# Patient Record
Sex: MALE
[De-identification: names, ages, dates, MRNs, and addresses within clinical notes are randomized per-mention and may not be internally consistent; named-entity substitution may affect disease eponyms.]

---

## 2024-09-24 PROBLEM — Z00.00 ENCOUNTER FOR PREVENTIVE HEALTH EXAMINATION: Status: ACTIVE | Noted: 2024-09-24

## 2024-10-03 ENCOUNTER — NON-APPOINTMENT (OUTPATIENT)
Age: 24
End: 2024-10-03

## 2024-10-03 ENCOUNTER — TRANSCRIPTION ENCOUNTER (OUTPATIENT)
Age: 24
End: 2024-10-03

## 2024-10-03 ENCOUNTER — APPOINTMENT (OUTPATIENT)
Dept: INTERNAL MEDICINE | Facility: CLINIC | Age: 24
End: 2024-10-03
Payer: MEDICAID

## 2024-10-03 VITALS
HEART RATE: 83 BPM | RESPIRATION RATE: 16 BRPM | HEIGHT: 68 IN | SYSTOLIC BLOOD PRESSURE: 135 MMHG | BODY MASS INDEX: 38.49 KG/M2 | OXYGEN SATURATION: 97 % | DIASTOLIC BLOOD PRESSURE: 85 MMHG | WEIGHT: 254 LBS

## 2024-10-03 DIAGNOSIS — Z00.00 ENCOUNTER FOR GENERAL ADULT MEDICAL EXAMINATION W/OUT ABNORMAL FINDINGS: ICD-10-CM

## 2024-10-03 DIAGNOSIS — Z83.3 FAMILY HISTORY OF DIABETES MELLITUS: ICD-10-CM

## 2024-10-03 PROCEDURE — 93000 ELECTROCARDIOGRAM COMPLETE: CPT

## 2024-10-03 PROCEDURE — 36415 COLL VENOUS BLD VENIPUNCTURE: CPT

## 2024-10-03 PROCEDURE — 99385 PREV VISIT NEW AGE 18-39: CPT

## 2024-10-03 NOTE — HISTORY OF PRESENT ILLNESS
[de-identified] : Patient comes to the office for the first time for a physical with overall no recent changes in exertional or functional ability.  Has not seen any other medical care provider. No acute complaints.

## 2024-10-03 NOTE — PHYSICAL EXAM
[Obese] : patient was observed to be obese [Normal] : normal gait, coordination grossly intact, no focal deficits [de-identified] : Declined

## 2024-10-03 NOTE — HEALTH RISK ASSESSMENT
[Good] : ~his/her~  mood as  good [Yes] : Yes [2 - 3 times a week (3 pts)] : 2 - 3  times a week (3 points) [1 or 2 (0 pts)] : 1 or 2 (0 points) [Never (0 pts)] : Never (0 points) [No falls in past year] : Patient reported no falls in the past year [0] : 2) Feeling down, depressed, or hopeless: Not at all (0) [PHQ-2 Negative - No further assessment needed] : PHQ-2 Negative - No further assessment needed [Patient declined Retinal Exam] : Patient declined Retinal Exam. [HIV test declined] : HIV test declined [Hepatitis C test declined] : Hepatitis C test declined [None] : None [With Family] : lives with family [Employed] : employed [Single] : single [# Of Children ___] : has [unfilled] children [Fully functional (bathing, dressing, toileting, transferring, walking, feeding)] : Fully functional (bathing, dressing, toileting, transferring, walking, feeding) [Fully functional (using the telephone, shopping, preparing meals, housekeeping, doing laundry, using] : Fully functional and needs no help or supervision to perform IADLs (using the telephone, shopping, preparing meals, housekeeping, doing laundry, using transportation, managing medications and managing finances) [Smoke Detector] : smoke detector [Carbon Monoxide Detector] : carbon monoxide detector [Never] : Never [Audit-CScore] : 3 [[id number]] : 0 [Reports changes in hearing] : Reports no changes in hearing [Reports changes in vision] : Reports no changes in vision [Reports changes in dental health] : Reports no changes in dental health

## 2024-10-04 LAB
ALBUMIN SERPL ELPH-MCNC: 4.4 G/DL
ALP BLD-CCNC: 45 U/L
ALT SERPL-CCNC: 43 U/L
ANION GAP SERPL CALC-SCNC: 15 MMOL/L
AST SERPL-CCNC: 22 U/L
BILIRUB SERPL-MCNC: 0.4 MG/DL
BUN SERPL-MCNC: 15 MG/DL
CALCIUM SERPL-MCNC: 9.6 MG/DL
CHLORIDE SERPL-SCNC: 104 MMOL/L
CHOLEST SERPL-MCNC: 186 MG/DL
CO2 SERPL-SCNC: 23 MMOL/L
CREAT SERPL-MCNC: 1.11 MG/DL
EGFR: 96 ML/MIN/1.73M2
ESTIMATED AVERAGE GLUCOSE: 117 MG/DL
GLUCOSE SERPL-MCNC: 105 MG/DL
HBA1C MFR BLD HPLC: 5.7 %
HCT VFR BLD CALC: 44.5 %
HDLC SERPL-MCNC: 34 MG/DL
HGB BLD-MCNC: 14.1 G/DL
LDLC SERPL CALC-MCNC: 124 MG/DL
MCHC RBC-ENTMCNC: 26.6 PG
MCHC RBC-ENTMCNC: 31.7 GM/DL
MCV RBC AUTO: 83.8 FL
NONHDLC SERPL-MCNC: 152 MG/DL
PLATELET # BLD AUTO: 243 K/UL
POTASSIUM SERPL-SCNC: 4.6 MMOL/L
PROT SERPL-MCNC: 7.2 G/DL
RBC # BLD: 5.31 M/UL
RBC # FLD: 11.9 %
SODIUM SERPL-SCNC: 142 MMOL/L
TRIGL SERPL-MCNC: 155 MG/DL
TSH SERPL-ACNC: 1.83 UIU/ML
WBC # FLD AUTO: 6.51 K/UL

## 2025-01-08 ENCOUNTER — APPOINTMENT (OUTPATIENT)
Dept: INTERNAL MEDICINE | Facility: CLINIC | Age: 25
End: 2025-01-08
Payer: MEDICAID

## 2025-01-08 DIAGNOSIS — A08.11 ACUTE GASTROENTEROPATHY DUE TO NORWALK AGENT: ICD-10-CM

## 2025-01-08 PROCEDURE — 99213 OFFICE O/P EST LOW 20 MIN: CPT

## 2025-01-08 NOTE — HISTORY OF PRESENT ILLNESS
[Home] : at home, [unfilled] , at the time of the visit. [Medical Office: ([facility])___] : at the medical office located in  [Verbal consent obtained from patient] : the patient, [unfilled] [FreeTextEntry8] : Patient complains of initially having frequent diarrhea and the next day started to have vomiting as well.  No fever.  Generalized abdominal discomfort.  Now anything that he eats or drinks causing him to have diarrhea but symptoms overall are improving. No shortness of breath or difficulty breathing.

## 2025-01-15 ENCOUNTER — APPOINTMENT (OUTPATIENT)
Dept: INTERNAL MEDICINE | Facility: CLINIC | Age: 25
End: 2025-01-15

## 2025-02-06 ENCOUNTER — APPOINTMENT (OUTPATIENT)
Dept: INTERNAL MEDICINE | Facility: CLINIC | Age: 25
End: 2025-02-06

## 2025-05-21 ENCOUNTER — APPOINTMENT (OUTPATIENT)
Dept: INTERNAL MEDICINE | Facility: CLINIC | Age: 25
End: 2025-05-21
Payer: MEDICAID

## 2025-05-21 VITALS
SYSTOLIC BLOOD PRESSURE: 143 MMHG | HEART RATE: 91 BPM | WEIGHT: 271.31 LBS | RESPIRATION RATE: 16 BRPM | DIASTOLIC BLOOD PRESSURE: 96 MMHG | OXYGEN SATURATION: 99 % | HEIGHT: 68 IN | BODY MASS INDEX: 41.12 KG/M2

## 2025-05-21 DIAGNOSIS — E11.9 TYPE 2 DIABETES MELLITUS W/OUT COMPLICATIONS: ICD-10-CM

## 2025-05-21 LAB
BILIRUB UR QL STRIP: NORMAL
GLUCOSE UR-MCNC: NORMAL
HCG UR QL: 0.2 EU/DL
HGB UR QL STRIP.AUTO: NORMAL
KETONES UR-MCNC: NORMAL
LEUKOCYTE ESTERASE UR QL STRIP: NEGATIVE
NITRITE UR QL STRIP: NEGATIVE
PH UR STRIP: 5.5
PROT UR STRIP-MCNC: NORMAL
SP GR UR STRIP: 1.02

## 2025-05-21 PROCEDURE — 99213 OFFICE O/P EST LOW 20 MIN: CPT

## 2025-05-21 PROCEDURE — 81003 URINALYSIS AUTO W/O SCOPE: CPT | Mod: QW

## 2025-05-21 PROCEDURE — 36415 COLL VENOUS BLD VENIPUNCTURE: CPT

## 2025-05-21 RX ORDER — BLOOD-GLUCOSE METER
KIT MISCELLANEOUS
Qty: 100 | Refills: 2 | Status: ACTIVE | COMMUNITY
Start: 2025-05-21 | End: 1900-01-01

## 2025-05-21 RX ORDER — LANCETS 33 GAUGE
EACH MISCELLANEOUS
Qty: 100 | Refills: 2 | Status: ACTIVE | COMMUNITY
Start: 2025-05-21 | End: 1900-01-01

## 2025-05-21 RX ORDER — METFORMIN ER 500 MG 500 MG/1
500 TABLET ORAL
Qty: 180 | Refills: 1 | Status: ACTIVE | COMMUNITY
Start: 2025-05-21 | End: 1900-01-01

## 2025-05-21 RX ORDER — BLOOD-GLUCOSE METER
W/DEVICE KIT MISCELLANEOUS
Qty: 1 | Refills: 0 | Status: ACTIVE | COMMUNITY
Start: 2025-05-21 | End: 1900-01-01

## 2025-05-21 NOTE — HEALTH RISK ASSESSMENT
[2 - 3 times a week (3 pts)] : 2 - 3  times a week (3 points) [1 or 2 (0 pts)] : 1 or 2 (0 points) [Never (0 pts)] : Never (0 points) [0] : 2) Feeling down, depressed, or hopeless: Not at all (0) [PHQ-2 Negative - No further assessment needed] : PHQ-2 Negative - No further assessment needed [Never] : Never [Audit-CScore] : 3 [[id number]] : 0

## 2025-05-21 NOTE — PLAN
[FreeTextEntry1] : Sugar level in the office 331.  Urinalysis shows glucose, red blood cells and white blood cells. Will probably need blood pressure medications.

## 2025-05-21 NOTE — HISTORY OF PRESENT ILLNESS
[de-identified] : Patient comes to the office with history of 20 to 30 pound weight loss over the past month.  Also says increased urinary frequency.  Increased thirst.  No nausea vomiting abdominal discomfort shortness of breath difficulty breathing no bowel changes. Feeling tired.

## 2025-05-23 LAB
ALBUMIN SERPL ELPH-MCNC: 5.3 G/DL
ALP BLD-CCNC: 84 U/L
ALT SERPL-CCNC: 78 U/L
ANION GAP SERPL CALC-SCNC: 25 MMOL/L
AST SERPL-CCNC: 59 U/L
BILIRUB SERPL-MCNC: 0.5 MG/DL
BUN SERPL-MCNC: 11 MG/DL
CALCIUM SERPL-MCNC: 10.3 MG/DL
CHLORIDE SERPL-SCNC: 96 MMOL/L
CO2 SERPL-SCNC: 13 MMOL/L
CREAT SERPL-MCNC: 1.03 MG/DL
EGFRCR SERPLBLD CKD-EPI 2021: 105 ML/MIN/1.73M2
ESTIMATED AVERAGE GLUCOSE: 286 MG/DL
GLUCOSE SERPL-MCNC: 347 MG/DL
HBA1C MFR BLD HPLC: 11.6 %
POTASSIUM SERPL-SCNC: 4.9 MMOL/L
PROT SERPL-MCNC: 8.4 G/DL
SODIUM SERPL-SCNC: 134 MMOL/L

## 2025-05-27 ENCOUNTER — TRANSCRIPTION ENCOUNTER (OUTPATIENT)
Age: 25
End: 2025-05-27

## 2025-05-28 ENCOUNTER — APPOINTMENT (OUTPATIENT)
Dept: BARIATRICS/WEIGHT MGMT | Facility: CLINIC | Age: 25
End: 2025-05-28
Payer: MEDICAID

## 2025-05-28 VITALS
DIASTOLIC BLOOD PRESSURE: 80 MMHG | WEIGHT: 272 LBS | SYSTOLIC BLOOD PRESSURE: 140 MMHG | HEART RATE: 88 BPM | BODY MASS INDEX: 41.22 KG/M2 | HEIGHT: 68 IN

## 2025-05-28 DIAGNOSIS — E11.65 TYPE 2 DIABETES MELLITUS WITH HYPERGLYCEMIA: ICD-10-CM

## 2025-05-28 PROBLEM — E66.813 CLASS 3 SEVERE OBESITY DUE TO EXCESS CALORIES WITHOUT SERIOUS COMORBIDITY WITH BODY MASS INDEX (BMI) OF 40.0 TO 44.9 IN ADULT: Status: ACTIVE | Noted: 2025-05-21

## 2025-05-28 PROCEDURE — 99205 OFFICE O/P NEW HI 60 MIN: CPT

## 2025-05-28 RX ORDER — INSULIN GLARGINE 100 [IU]/ML
100 INJECTION, SOLUTION SUBCUTANEOUS
Qty: 5 | Refills: 5 | Status: ACTIVE | COMMUNITY
Start: 2025-05-28 | End: 1900-01-01

## 2025-05-28 RX ORDER — PEN NEEDLE, DIABETIC 32 GX 1/4"
32G X 6 MM NEEDLE, DISPOSABLE MISCELLANEOUS
Qty: 1 | Refills: 5 | Status: ACTIVE | COMMUNITY
Start: 2025-05-28 | End: 1900-01-01

## 2025-05-28 NOTE — HISTORY OF PRESENT ILLNESS
[FreeTextEntry1] :  Diagnosed  Type w diabetes new onset  Had prediabetes in 10/2024 and now A1C is 11.6% Patient has lost 30 lbs in over a month.  Unintentional.  Referred by Dr. Domo Tenorio MD  PMH:  obesity  diabetes ?proteiuria PSH:  none Supposed to get [place] [facility] out on Wednesday but his blood sugars are still too high  Blood sugar: Patients tests bid before breakfast and 2 hours after dinner Morning sugars 270-319 Dinner:  2 hours pp 200-270 Diabetes associated complications: polyuria but stopped when started Metformin.  His polydipsia is improving as well when started Metformin. He is having some mild GI distress with metformin. Slight improvement with Last eye exam: A year:  When sugar comes down slightly he will be referred to an ophthalmologist.  Last foot exam: never  Social:  Single  [facility] No smoking ETOH 1 every 3 weeks.  Lives with parents No drug use.  Home diabetes medications:  Metformin 500 ER two tablets daily  Hypoglycemia: one  Severe hypoglycemia: Yes 200-300s, slightly helped with metformin  Has glucagon: N Dietary patterns:  B: 2 eggs with spoonful of ground turkey L: tuna wrap with low carb [place] wrap.  Veggies D.  Chicken with vegetables, or ground turkey with spinach or eggs with Veggies.  Has not been snacking except for blueberries.  Sugary beverages: no juice or soda:  Gatorade 0 is okay Staying away from fruits:  2 fruits per day  Physical activity: No exercise but gong on walks, too fatigued. Will restart once paitent has improved glycemic control.   Labs: 5/23/25 A1C: 11.4% prior was 5.7% 10/2024 sodium 134 Glucose 334 Creatinine: 1.03 GFR: 105 AST/ALT: 59/58 - fatty liver Total cholesterol: 10/2425   HDL 34 TSH 1.83 Urine dip done at Dr. Tenorio's office one week ago negative for ketones + glucose.  Microalbumin: none Family Hx:  Mom's brother type1 diabetes

## 2025-05-28 NOTE — REVIEW OF SYSTEMS
[Fatigue] : fatigue [Decreased Appetite] : decreased appetite [Recent Weight Loss (___ Lbs)] : recent weight loss: [unfilled] lbs [Blurred Vision] : no blurred vision [Diarrhea] : diarrhea [Negative] : Endocrine

## 2025-05-28 NOTE — PHYSICAL EXAM
[Alert] : alert [No Neck Mass] : no neck mass was observed [Thyroid Not Enlarged] : the thyroid was not enlarged [No Thyroid Nodules] : no palpable thyroid nodules [No Respiratory Distress] : no respiratory distress [Clear to Auscultation] : lungs were clear to auscultation bilaterally [Normal PMI] : the apical impulse was normal [Normal S1, S2] : normal S1 and S2 [Regular Rhythm] : with a regular rhythm [No Stigmata of Cushings Syndrome] : no stigmata of Cushings Syndrome [Oriented x3] : oriented to person, place, and time

## 2025-05-30 LAB
ANION GAP SERPL CALC-SCNC: 24 MMOL/L
BUN SERPL-MCNC: 14 MG/DL
C PEPTIDE SERPL-MCNC: 2.5 NG/ML
CALCIUM SERPL-MCNC: 9.9 MG/DL
CHLORIDE SERPL-SCNC: 97 MMOL/L
CO2 SERPL-SCNC: 16 MMOL/L
CREAT SERPL-MCNC: 0.92 MG/DL
EGFRCR SERPLBLD CKD-EPI 2021: 120 ML/MIN/1.73M2
GLUCOSE SERPL-MCNC: 310 MG/DL
POTASSIUM SERPL-SCNC: 4.1 MMOL/L
SODIUM SERPL-SCNC: 137 MMOL/L

## 2025-06-02 LAB
GAD65 AB SER-MCNC: 0 NMOL/L
PANC ISLET CELL AB SER QL: NORMAL

## 2025-06-04 ENCOUNTER — APPOINTMENT (OUTPATIENT)
Dept: BARIATRICS/WEIGHT MGMT | Facility: CLINIC | Age: 25
End: 2025-06-04
Payer: MEDICAID

## 2025-06-04 VITALS
SYSTOLIC BLOOD PRESSURE: 138 MMHG | WEIGHT: 270 LBS | BODY MASS INDEX: 40.92 KG/M2 | HEART RATE: 86 BPM | DIASTOLIC BLOOD PRESSURE: 80 MMHG | HEIGHT: 68 IN

## 2025-06-04 PROBLEM — E11.65 UNCONTROLLED TYPE 2 DIABETES MELLITUS WITH HYPERGLYCEMIA, WITH LONG-TERM CURRENT USE OF INSULIN: Status: ACTIVE | Noted: 2025-06-04

## 2025-06-04 PROCEDURE — 99214 OFFICE O/P EST MOD 30 MIN: CPT

## 2025-06-04 RX ORDER — METFORMIN HYDROCHLORIDE 500 MG/1
500 TABLET, EXTENDED RELEASE ORAL
Qty: 60 | Refills: 5 | Status: ACTIVE | COMMUNITY
Start: 2025-06-04 | End: 1900-01-01

## 2025-06-04 RX ORDER — INSULIN ASPART 100 [IU]/ML
100 INJECTION, SOLUTION INTRAVENOUS; SUBCUTANEOUS
Qty: 5 | Refills: 3 | Status: ACTIVE | COMMUNITY
Start: 2025-06-04 | End: 1900-01-01

## 2025-06-04 NOTE — PHYSICAL EXAM
[Alert] : alert [Obese] : obese [No Respiratory Distress] : no respiratory distress [Clear to Auscultation] : lungs were clear to auscultation bilaterally [Normal PMI] : the apical impulse was normal [Normal S1, S2] : normal S1 and S2 [Regular Rhythm] : with a regular rhythm [Oriented x3] : oriented to person, place, and time [Normal Insight/Judgement] : insight and judgment were intact

## 2025-06-07 LAB — ZINC TRANSPORTER 8 AB: <15 U/ML

## 2025-06-13 ENCOUNTER — APPOINTMENT (OUTPATIENT)
Facility: CLINIC | Age: 25
End: 2025-06-13
Payer: MEDICAID

## 2025-06-13 VITALS — WEIGHT: 280 LBS | BODY MASS INDEX: 42.44 KG/M2 | HEIGHT: 68 IN

## 2025-06-13 PROCEDURE — 99214 OFFICE O/P EST MOD 30 MIN: CPT

## 2025-06-13 NOTE — HISTORY OF PRESENT ILLNESS
[FreeTextEntry1] : Patient here for follow up for type 2 diabetes:   Diagnosed  Type w diabetes new onset  Had prediabetes in 10/2024 and now A1C is 11.6% Patient has lost 30 lbs in over a month.  Unintentional.  Referred by Dr. Domo Tenorio MD  PMH:  obesity  diabetes ?proteiuria PSH:  none Supposed to get [place] [facility] out on Wednesday but his blood sugars are still too high  Blood sugar: Patients tests bid before breakfast and 2 hours after dinner Morning sugars 270-319 Dinner:  2 hours pp 200-270 Diabetes associated complications: polyuria but stopped when started Metformin.  His polydipsia is improving as well when started Metformin. He is having some mild GI distress with metformin. Slight improvement with Last eye exam: A year:  When sugar comes down slightly he will be referred to an ophthalmologist.  Last foot exam: never  Social:  Single  [facility] No smoking ETOH 1 every 3 weeks.  Lives with parents No drug use.  Home diabetes medications:  Metformin 500 ER two tablets daily  Hypoglycemia: one  Severe hypoglycemia: Yes 200-300s, slightly helped with metformin  Has glucagon: N Dietary patterns:  B: 2 eggs with spoonful of ground turkey L: tuna wrap with low carb [place] wrap.  Veggies D.  Chicken with vegetables, or ground turkey with spinach or eggs with Veggies.  Has not been snacking except for blueberries.  Sugary beverages: no juice or soda:  Gatorade 0 is okay Staying away from fruits:  2 fruits per day  Physical activity: No exercise but gong on walks, too fatigued. Will restart once paitent has improved glycemic control.   Labs: 5/23/25 A1C: 11.4% prior was 5.7% 10/2024 sodium 134 Glucose 334 Creatinine: 1.03 GFR: 105 AST/ALT: 59/58 - fatty liver Total cholesterol: 10/2425   HDL 34 TSH 1.83 Urine dip done at Dr. Tenorio's office one week ago negative for ketones + glucose.  Microalbumin: none Family Hx:  Mom's brother type1 diabetes   6/4/25:  Eating low carb meals, claims to not eating starches or juice.  Mostly protein and vegetable. s Blood sugars are as follows Fasting 299-300 2 hours -409 2 hours after dinner 239-304  Patient is following low carb diet  - I reviewed with him what is carb and not a carb.  There is less polydypsia and polyuria Metformin 500 mg bid which is giving him gastric distress On insulin Lantus 20 units Labs:  elevated glucose in 300s  with slight anion gap GAD65, islet cell antibody neg, ZNT8 antibody c-peptide 2.5.  Mild deficiency (probably due to glucose toxicity)

## 2025-06-13 NOTE — PHYSICAL EXAM
[Obese] : obese [Oriented x3] : oriented to person, place, and time [Normal Insight/Judgement] : insight and judgment were intact

## 2025-06-13 NOTE — HISTORY OF PRESENT ILLNESS
[FreeTextEntry1] : Patient here for follow up for type 2 diabetes:   Diagnosed  Type w diabetes new onset  Had prediabetes in 10/2024 and now A1C is 11.6% Patient has lost 30 lbs in over a month.  Unintentional.  Referred by Dr. Domo Tenorio MD  PMH:  obesity  diabetes ?proteiuria PSH:  none Supposed to get [place] [facility] out on Wednesday but his blood sugars are still too high  Blood sugar: Patients tests bid before breakfast and 2 hours after dinner Morning sugars 270-319 Dinner:  2 hours pp 200-270 Diabetes associated complications: polyuria but stopped when started Metformin.  His polydipsia is improving as well when started Metformin. He is having some mild GI distress with metformin. Slight improvement with Last eye exam: A year:  When sugar comes down slightly he will be referred to an ophthalmologist.  Last foot exam: never  Social:  Single  [facility] No smoking ETOH 1 every 3 weeks.  Lives with parents No drug use.  Home diabetes medications:  Metformin 500 ER two tablets daily  Hypoglycemia: one  Severe hypoglycemia: Yes 200-300s, slightly helped with metformin  Has glucagon: N Dietary patterns:  B: 2 eggs with spoonful of ground turkey L: tuna wrap with low carb [place] wrap.  Veggies D.  Chicken with vegetables, or ground turkey with spinach or eggs with Veggies.  Has not been snacking except for blueberries.  Sugary beverages: no juice or soda:  Gatorade 0 is okay Staying away from fruits:  2 fruits per day  Physical activity: No exercise but gong on walks, too fatigued. Will restart once paitent has improved glycemic control.   Labs: 5/23/25 A1C: 11.4% prior was 5.7% 10/2024 sodium 134 Glucose 334 Creatinine: 1.03 GFR: 105 AST/ALT: 59/58 - fatty liver Total cholesterol: 10/2425   HDL 34 TSH 1.83 Urine dip done at Dr. Tenorio's office one week ago negative for ketones + glucose.  Microalbumin: none Family Hx:  Mom's brother type1 diabetes   6/4/25:  Eating low carb meals, claims to not eating starches or juice.  Mostly protein and vegetable. s Blood sugars are as follows Fasting 299-300 2 hours -409 2 hours after dinner 239-304  Patient is following low carb diet  - I reviewed with him what is carb and not a carb.  There is less polydypsia and polyuria Metformin 500 mg bid which is giving him gastric distress On insulin Lantus 20 units Labs:  elevated glucose in 300s  with slight anion gap GAD65, islet cell antibody neg, ZNT8 antibody c-peptide 2.5.  Mild deficiency (probably due to glucose toxicity)  6/13/25  Currently taking Lantus units 30 units, Taking insulin  Aspart 10 units before each meal. Fasting sugars:   post prandials for B, L D 68-.  One hypo at 68  Diet is good.  Following low carb diet.

## 2025-06-13 NOTE — ASSESSMENT
[FreeTextEntry1] : Diabetes Management: - Assessment : Patient's weight has slightly increased. Blood glucose levels have improved with current insulin regimen. Fasting sugars range from 83 to 159 mg/dL. Post-prandial levels for breakfast, lunch, and dinner are generally within target range. One hypoglycemic episode noted. HbA1c is 6.8%. Patient is following a low-carb diet and using the plate method. Currently taking Lantus 30 units and Aspart 10 units before each meal. Patient reports feeling much better with reduced glucose toxicity. - Plan : - Continue Lantus at 30 units  - Decrease Aspart to 8 units before each meal  - Monitor for hypoglycemia and decrease Aspart by 2 units if low blood sugars persist  - Continue Metformin  - Follow 15-15 rule for hypoglycemia management: 15 grams of fast-acting carbohydrates  - Purchase glucose tablets for hypoglycemia management  - If low blood sugar in AM, decrease Lantus by 2 units every 3 days  - Follow up in 4 weeks  - Consider switching to GLP-1 or oral medications after glucose toxicity resolves  - Attempt to pre-authorize CAT scan to rule out pancreatitis and insulinoma  - Prescription for pen needles sent to pharmacy  - Patient to continue low-carb diet with some fruit intake  - Patient to contact provider if experiencing persistent hypoglycemia or other concerns  Class 3 obesity:  Will try to get GLP-1 for diabetes in 4 weeks, patient after 2 months on insulin can be tried on oral meds as well.

## 2025-06-13 NOTE — HISTORY OF PRESENT ILLNESS
[FreeTextEntry1] : Patient here for follow up for type 2 diabetes:   Diagnosed  Type w diabetes new onset  Had prediabetes in 10/2024 and now A1C is 11.6% Patient has lost 30 lbs in over a month.  Unintentional.  Referred by Dr. Domo Tenorio MD  PMH:  obesity  diabetes ?proteiuria PSH:  none Supposed to get [place] To[facility] out on Wednesday but his blood sugars are still too high  Blood sugar: Patients tests bid before breakfast and 2 hours after dinner Morning sugars 270-319 Dinner:  2 hours pp 200-270 Diabetes associated complications: polyuria but stopped when started Metformin.  His polydipsia is improving as well when started Metformin. He is having some mild GI distress with metformin. Slight improvement with Last eye exam: A year:  When sugar comes down slightly he will be referred to an ophthalmologist.  Last foot exam: never  Social:  Single  [facility] No smoking ETOH 1 every 3 weeks.  Lives with parents No drug use.  Home diabetes medications:  Metformin 500 ER two tablets daily  Hypoglycemia: one  Severe hypoglycemia: Yes 200-300s, slightly helped with metformin  Has glucagon: N Dietary patterns:  B: 2 eggs with spoonful of ground turkey L: tuna wrap with low carb [place] wrap.  Veggies D.  Chicken with vegetables, or ground turkey with spinach or eggs with Veggies.  Has not been snacking except for blueberries.  Sugary beverages: no juice or soda:  Gatorade 0 is okay Staying away from fruits:  2 fruits per day  Physical activity: No exercise but gong on walks, too fatigued. Will restart once paitent has improved glycemic control.   Labs: 5/23/25 A1C: 11.4% prior was 5.7% 10/2024 sodium 134 Glucose 334 Creatinine: 1.03 GFR: 105 AST/ALT: 59/58 - fatty liver Total cholesterol: 10/2425   HDL 34 TSH 1.83 Urine dip done at Dr. Tenorio's office one week ago negative for ketones + glucose.  Microalbumin: none Family Hx:  Mom's brother type1 diabetes   6/4/25:  Eating low carb meals, claims to not eating starches or juice.  Mostly protein and vegetable. s Blood sugars are as follows Fasting 299-300 2 hours -409 2 hours after dinner 239-304  Patient is following low carb diet  - I reviewed with him what is carb and not a carb.  There is less polydypsia and polyuria Metformin 500 mg bid which is giving him gastric distress On insulin Lantus 20 units Labs:  elevated glucose in 300s  with slight anion gap GAD65, islet cell antibody neg, ZNT8 antibody c-peptide 2.5.  Mild deficiency (probably due to glucose toxicity)

## 2025-06-13 NOTE — ASSESSMENT
[FreeTextEntry1] : Patients A1C went from 6.4% in 10/2024 to 11.6% in 6 months.  His antibody testing for type 1 diabetes was negative.  PPatient denies binge eating.  Due to the quick rise in A!C over 7 months we must rule out an insulinoma or pancreatic cancer.

## 2025-06-13 NOTE — ASSESSMENT
[FreeTextEntry1] : Patients A1C went from 6.4% in 10/2024 to 11.6% in 6 months.  His antibody testing for type 1 diabetes was negative.  PPatient denies binge eating.  Due to the quick rise in A!C over 7 months we must rule out an insulinoma or pancreatic cancer.  in ASU:

## 2025-06-23 ENCOUNTER — TRANSCRIPTION ENCOUNTER (OUTPATIENT)
Age: 25
End: 2025-06-23

## 2025-06-26 ENCOUNTER — RESULT REVIEW (OUTPATIENT)
Age: 25
End: 2025-06-26

## 2025-07-01 ENCOUNTER — NON-APPOINTMENT (OUTPATIENT)
Age: 25
End: 2025-07-01

## 2025-07-15 ENCOUNTER — RESULT REVIEW (OUTPATIENT)
Age: 25
End: 2025-07-15

## 2025-07-17 ENCOUNTER — TRANSCRIPTION ENCOUNTER (OUTPATIENT)
Age: 25
End: 2025-07-17

## 2025-07-17 RX ORDER — INSULIN GLARGINE 100 [IU]/ML
100 INJECTION, SOLUTION SUBCUTANEOUS
Qty: 5 | Refills: 0 | Status: ACTIVE | COMMUNITY
Start: 2025-07-17 | End: 1900-01-01

## 2025-07-18 ENCOUNTER — APPOINTMENT (OUTPATIENT)
Facility: CLINIC | Age: 25
End: 2025-07-18
Payer: MEDICAID

## 2025-07-18 VITALS
BODY MASS INDEX: 42.34 KG/M2 | HEART RATE: 88 BPM | HEIGHT: 68 IN | WEIGHT: 279.4 LBS | DIASTOLIC BLOOD PRESSURE: 70 MMHG | SYSTOLIC BLOOD PRESSURE: 130 MMHG

## 2025-07-18 PROCEDURE — 99214 OFFICE O/P EST MOD 30 MIN: CPT

## 2025-07-18 NOTE — ASSESSMENT
[FreeTextEntry1] : Diabetes Management: - Assessment : Patient's blood glucose numbers have improved with insulin therapy. Fasting numbers are excellent, but pre-lunch numbers are slightly low. Patient is currently on 30 units of insulin glargine and 8 units insulin aspart before meals.  Patient is following a low-carb diet and exercising regularly after work. Patient experienced some low blood sugar episodes before exercise.  - Plan : - Continue insulin glargine at 30 units  - Adjust insulin aspart doses: 6 units before breakfast, 8 units before lunch, 8 units before dinner  - Advise patient to have a snack before exercise if blood sugar is under 150  - Recommend glucose tablets for hypoglycemia management  - Continue current diet and exercise regimen  - Monitor blood sugar levels, especially around exercise times  Abdominal MRI Follow-up: - Assessment : Patient underwent an MRI of the abdomen and pancreas. Results are not yet available. Previous CAT scan showed no neuroendocrine tumor but revealed a suspicious area that could be a mass or heterogeneous inding. - Plan : - Await MRI results (expected within a week)  - Patient to follow up with Dr. Tenorio if no results received by next week  - Continue insulin therapy until MRI results and surgical consult are completed  - Consider transition to oral medications (e.g., Metformin, GLP-1 agonists, or SGLT2 inhibitors) based on MRI findings and surgeon's opinion  Medication Management: - Assessment : Patient experienced difficulties obtaining insulin glargine refill due to pharmacy issues. Currently has limited supply remaining. - Plan : - Provided two-day emergency supply of insulin  - Advised patient to use Toujeo if insulin glargine is not available on Monday  - Clarified that generic insulin can be dispensed unless specifically marked as 'Dispense as Written' ([facility])  - Encouraged patient to communicate any future medication access issues promptly

## 2025-07-18 NOTE — PHYSICAL EXAM
[Alert] : alert [Normal Sensation on Monofilament Testing] : normal sensation on monofilament testing of lower extremities [Oriented x3] : oriented to person, place, and time

## 2025-07-18 NOTE — HISTORY OF PRESENT ILLNESS
[FreeTextEntry1] : Patient here for follow up for type 2 diabetes:   Diagnosed  Type w diabetes new onset  Had prediabetes in 10/2024 and now A1C is 11.6% Patient has lost 30 lbs in over a month.  Unintentional.  Referred by Dr. Domo Tenorio MD  PMH:  obesity  diabetes ?proteiuria PSH:  none Supposed to get [place] Tooth out on Wednesday but his blood sugars are still too high  Blood sugar: Patients tests bid before breakfast and 2 hours after dinner Morning sugars 270-319 Dinner:  2 hours pp 200-270 Diabetes associated complications: polyuria but stopped when started Metformin.  His polydipsia is improving as well when started Metformin. He is having some mild GI distress with metformin. Slight improvement with Last eye exam: A year:  When sugar comes down slightly he will be referred to an ophthalmologist.  Last foot exam: never  Social:  Single  [facility] No smoking ETOH 1 every 3 weeks.  Lives with parents No drug use.  Home diabetes medications:  Metformin 500 ER two tablets daily  Hypoglycemia: one  Severe hypoglycemia: Yes 200-300s, slightly helped with metformin  Has glucagon: N Dietary patterns:  B: 2 eggs with spoonful of ground turkey L: tuna wrap with low carb [place] wrap.  Veggies D.  Chicken with vegetables, or ground turkey with spinach or eggs with Veggies.  Has not been snacking except for blueberries.  Sugary beverages: no juice or soda:  Gatorade 0 is okay Staying away from fruits:  2 fruits per day  Physical activity: No exercise but gong on walks, too fatigued. Will restart once paitent has improved glycemic control.   Labs: 5/23/25 A1C: 11.4% prior was 5.7% 10/2024 sodium 134 Glucose 334 Creatinine: 1.03 GFR: 105 AST/ALT: 59/58 - fatty liver Total cholesterol: 10/2425   HDL 34 TSH 1.83 Urine dip done at Dr. Tenorio's office one week ago negative for ketones + glucose.  Microalbumin: none Family Hx:  Mom's brother type1 diabetes   6/4/25:  Eating low carb meals, claims to not eating starches or juice.  Mostly protein and vegetable. s Blood sugars are as follows Fasting 299-300 2 hours -409 2 hours after dinner 239-304  Patient is following low carb diet  - I reviewed with him what is carb and not a carb.  There is less polydypsia and polyuria Metformin 500 mg bid which is giving him gastric distress On insulin Lantus 20 units Labs:  elevated glucose in 300s  with slight anion gap GAD65, islet cell antibody neg, ZNT8 antibody c-peptide 2.5.  Mild deficiency (probably due to glucose toxicity)  6/13/25  Currently taking Lantus units 30 units, Taking insulin  Aspart 10 units before each meal. Fasting sugars:   post prandials for B, L D 68-.  One hypo at 68  Diet is good.  Following low carb diet.   7/18/2025  Patient is without long acting insulin.  Asked for refill too late and pharmacy will only pay for insulin glargine pen generic.  They had to order medication for patient.  Patient given samples of Toujeo in office.  Taking 30 units of Lantus (now insulin glargine) taking 8 units of insulin aspart per meal  Blood sugar fasting  Pre Lunch  Dinner  Bedtime 102-130  Lower carb iet, staying away from fried food.   Exercising goes every day after work - glucose numbers during exercise are stable.  Patient gives himself complex carb snack prior to workouts.    Patient went for a MRI of his Abdomen and Pancreas.  Results are not in yet.  There was no neuroendocrine tumor on CT scan but there as an area that was suspicious for a mass vs heterogeneous tissue

## 2025-07-28 ENCOUNTER — NON-APPOINTMENT (OUTPATIENT)
Age: 25
End: 2025-07-28

## 2025-08-06 ENCOUNTER — APPOINTMENT (OUTPATIENT)
Dept: SURGERY | Facility: CLINIC | Age: 25
End: 2025-08-06
Payer: MEDICAID

## 2025-08-06 VITALS
TEMPERATURE: 98.1 F | SYSTOLIC BLOOD PRESSURE: 136 MMHG | OXYGEN SATURATION: 98 % | BODY MASS INDEX: 42.59 KG/M2 | RESPIRATION RATE: 16 BRPM | HEIGHT: 68 IN | WEIGHT: 281 LBS | DIASTOLIC BLOOD PRESSURE: 85 MMHG | HEART RATE: 81 BPM

## 2025-08-06 DIAGNOSIS — R93.5 ABNORMAL FINDINGS ON DIAGNOSTIC IMAGING OF OTHER ABDOMINAL REGIONS, INCLUDING RETROPERITONEUM: ICD-10-CM

## 2025-08-06 PROCEDURE — 99203 OFFICE O/P NEW LOW 30 MIN: CPT

## 2025-08-13 LAB — CGA SERPL-MCNC: 46.4 NG/ML

## 2025-08-18 LAB
5OH-INDOLEACETATE UR-SCNC: 1.5 MG/G CREAT
CREAT ?TM UR-MCNC: 140 MG/DL

## 2025-08-25 ENCOUNTER — RX RENEWAL (OUTPATIENT)
Age: 25
End: 2025-08-25

## 2025-08-25 RX ORDER — BLOOD SUGAR DIAGNOSTIC
STRIP MISCELLANEOUS
Qty: 100 | Refills: 2 | Status: ACTIVE | COMMUNITY
Start: 2025-08-25 | End: 1900-01-01

## 2025-09-12 ENCOUNTER — APPOINTMENT (OUTPATIENT)
Facility: CLINIC | Age: 25
End: 2025-09-12
Payer: MEDICAID

## 2025-09-12 DIAGNOSIS — E11.65 TYPE 2 DIABETES MELLITUS WITH HYPERGLYCEMIA: ICD-10-CM

## 2025-09-12 DIAGNOSIS — E66.813 OBESITY, CLASS 3: ICD-10-CM

## 2025-09-12 DIAGNOSIS — R93.5 ABNORMAL FINDINGS ON DIAGNOSTIC IMAGING OF OTHER ABDOMINAL REGIONS, INCLUDING RETROPERITONEUM: ICD-10-CM

## 2025-09-12 DIAGNOSIS — Z79.4 TYPE 2 DIABETES MELLITUS WITH HYPERGLYCEMIA: ICD-10-CM

## 2025-09-12 PROCEDURE — 99214 OFFICE O/P EST MOD 30 MIN: CPT

## 2025-09-15 ENCOUNTER — TRANSCRIPTION ENCOUNTER (OUTPATIENT)
Age: 25
End: 2025-09-15

## 2025-09-15 ENCOUNTER — NON-APPOINTMENT (OUTPATIENT)
Age: 25
End: 2025-09-15

## 2025-09-15 LAB
ALBUMIN SERPL ELPH-MCNC: 4.7 G/DL
ALP BLD-CCNC: 51 U/L
ALT SERPL-CCNC: 118 U/L
ANION GAP SERPL CALC-SCNC: 18 MMOL/L
AST SERPL-CCNC: 80 U/L
BILIRUB SERPL-MCNC: 0.2 MG/DL
BUN SERPL-MCNC: 15 MG/DL
CALCIUM SERPL-MCNC: 9.8 MG/DL
CHLORIDE SERPL-SCNC: 106 MMOL/L
CO2 SERPL-SCNC: 18 MMOL/L
CREAT SERPL-MCNC: 1 MG/DL
EGFRCR SERPLBLD CKD-EPI 2021: 108 ML/MIN/1.73M2
ESTIMATED AVERAGE GLUCOSE: 128 MG/DL
GLUCOSE SERPL-MCNC: 110 MG/DL
HBA1C MFR BLD HPLC: 6.1 %
POTASSIUM SERPL-SCNC: 4.7 MMOL/L
PROT SERPL-MCNC: 7.5 G/DL
SODIUM SERPL-SCNC: 142 MMOL/L